# Patient Record
Sex: FEMALE | Race: WHITE
[De-identification: names, ages, dates, MRNs, and addresses within clinical notes are randomized per-mention and may not be internally consistent; named-entity substitution may affect disease eponyms.]

---

## 2019-12-27 ENCOUNTER — HOSPITAL ENCOUNTER (EMERGENCY)
Dept: HOSPITAL 41 - JD.ED | Age: 54
Discharge: HOME | End: 2019-12-27
Payer: COMMERCIAL
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
## 2019-12-27 DIAGNOSIS — S29.012A: Primary | ICD-10-CM

## 2019-12-27 NOTE — EDM.PDOC
ED HPI GENERAL MEDICAL PROBLEM





- General


Chief Complaint: Back Pain or Injury


Stated Complaint: UPPER BACK PAIN x 5 DAYS


Time Seen by Provider: 12/27/19 12:11


Source of Information: Reports: Patient


History Limitations: Reports: No Limitations





- History of Present Illness


INITIAL COMMENTS - FREE TEXT/NARRATIVE: 





Patient is a 53-year-old female who presents with complaints of upper back pain 

for the last 5 days.  She states 5 days ago she awoke with this pain.  Pain is 

worse on the left than the right.  If she turns her head to the left the pain 

does radiate down her left arm.  She denies numbness or tingling with this 

pain.  Patient had a deep tissue massage done yesterday which she states was 

very painful for her.  She was also seen by the chiropractor today who did 

adjust her, but stopped because it was causing her too much pain.  He 

recommended that she come to the ER to be evaluated and receive medications as 

needed.  She denies any traumatic injury to the area.  She has not fallen or 

done any activities out of the normal for her.  She has no history of chronic 

back pain.  Has no chronic health problems.  She has been taking Tylenol and 

ibuprofen for the pain.  Her last dose of ibuprofen 600 mg was at 8:00 this 

morning.


  ** Left Upper Back


Pain Score (Numeric/FACES): 10





- Related Data


 Allergies











Allergy/AdvReac Type Severity Reaction Status Date / Time


 


No Known Allergies Allergy   Verified 12/27/19 11:55











Home Meds: 


 Home Meds





Cyclobenzaprine [Flexeril] 10 mg PO Q8H PRN #15 tablet 12/27/19 [Rx]


Diclofenac Sodium [Voltaren] 50 mg PO Q8H 5 Days #15 tab.ec 12/27/19 [Rx]











Past Medical History


HEENT History: Reports: Impaired Vision


Psychiatric History: Reports: Anxiety, Depression





Social & Family History





- Tobacco Use


Smoking Status *Q: Never Smoker





- Caffeine Use


Caffeine Use: Reports: Tea





- Recreational Drug Use


Recreational Drug Use: No





ED ROS GENERAL





- Review of Systems


Review Of Systems: See Below


Constitutional: Reports: No Symptoms.  Denies: Weakness


HEENT: Reports: No Symptoms


Respiratory: Reports: No Symptoms


Cardiovascular: Reports: No Symptoms


Endocrine: Reports: No Symptoms


GI/Abdominal: Reports: No Symptoms


: Reports: No Symptoms


Musculoskeletal: Reports: Back Pain (upper back)


Skin: Reports: No Symptoms


Neurological: Reports: No Symptoms.  Denies: Paresthesia


Psychiatric: Reports: No Symptoms


Hematologic/Lymphatic: Reports: No Symptoms





ED EXAM, UPPER BACK/NECK PAIN





- Physical Exam


Exam: See Below


Exam Limited By: Altered Mental Status


General Appearance: Alert, WD/WN, No Apparent Distress


Neck Exam: Non-Tender, Full Range of Motion, Normal Alignment, Normal Inspection

, Other (negative spurling bilaterally)


Cardiovascular/Respiratory: Regular Rate, Rhythm, Normal Peripheral Pulses, 

Normal Breath Sounds, No Respiratory Distress


GI/Abdominal: Normal Bowel Sounds, Soft, Non-Tender


Back Exam: Normal Inspection, Full Range of Motion (with pain), Muscle Spasm (

left trapezius ), Paraspinal Tenderness (left T1-T8)


Extremities: Normal Inspection, Normal Range of Motion


Neurologic: No Motor/Sensory Deficits, Alert, Normal Mood/Affect, Oriented x 3


Psychiatric: Normal Affect, Normal Mood


Skin Exam: Normal Color, Warm/Dry


Lymphatic: No Adenopathy





Course





- Vital Signs


Last Recorded V/S: 


 Last Vital Signs











Temp  97.7 F   12/27/19 11:56


 


Pulse  71   12/27/19 11:56


 


Resp  16   12/27/19 11:56


 


BP  114/78   12/27/19 11:56


 


Pulse Ox  100   12/27/19 11:56














- Re-Assessments/Exams


Free Text/Narrative Re-Assessment/Exam: 


On exam, patient's pain is muscular in nature extending throughout the left 

trapezius muscle.  Muscle is tight on palpation.  Pain is worsened by moving 

her left arm or turning her head far to the left.  There is no acute injury to 

the area so I don't feel that an x-ray is warranted.  Treatment will be 

Flexeril and Voltaren.  Since patient did just take 600 mg of ibuprofen about 4 

hours ago I'll prescribe the Voltaren and have her take it when she gets home.  

Patient also has to drive about 50 miles to get home so we will not give the 

Flexeril in the emergency department as this can be sedating.  She requests 

medications be electronically prescribed [facility].  Discharge 

instructions as noted





Departure





- Departure


Time of Disposition: 12:31


Disposition: Home, Self-Care 01


Condition: Fair


Clinical Impression: 


 Muscle strain of upper back








- Discharge Information


*PRESCRIPTION DRUG MONITORING PROGRAM REVIEWED*: No


*COPY OF PRESCRIPTION DRUG MONITORING REPORT IN PATIENT JOSE: No


Prescriptions: 


Cyclobenzaprine [Flexeril] 10 mg PO Q8H PRN #15 tablet


 PRN Reason: Muscle Spasm


Diclofenac Sodium [Voltaren] 50 mg PO Q8H 5 Days #15 tab.ec


Instructions:  Muscle Strain, Easy-to-Read


Referrals: 


Isaias Ramírez MD [Primary Care Provider] - 


Forms:  ED Department Discharge


Additional Instructions: 


You were seen in the emergency Department today with upper back pain for the 

last 5 days.  On exam, your upper left paraspinal muscles are tender and 

spasmed indicating that her pain is likely muscular in nature. 





You have been prescribed Flexeril as needed for back spasms as well as Voltaren 

for pain and inflammation.  These medications have been set electronically to 

[facility].  Do not take ibuprofen while taking the Voltaren, however you 

may use Tylenol as needed.  The Flexeril can be sedating so we recommend that 

she do not drive after taking the medication until you know how the medication 

effects you.  





You may use heat, ice, and massage to the area.  You should begin to see 

improvement over the next couple days.  If he expands any new or worsening 

symptoms, please do not hesitate to return to the emergency department.





Sepsis Event Note





- Evaluation


Sepsis Screening Result: No Definite Risk





- Focused Exam


Vital Signs: 


 Vital Signs











  Temp Pulse Resp BP Pulse Ox


 


 12/27/19 11:56  97.7 F  71  16  114/78  100











Date Exam was Performed: 12/27/19


Time Exam was Performed: 12:46